# Patient Record
Sex: FEMALE | Race: OTHER | Employment: FULL TIME | URBAN - NONMETROPOLITAN AREA
[De-identification: names, ages, dates, MRNs, and addresses within clinical notes are randomized per-mention and may not be internally consistent; named-entity substitution may affect disease eponyms.]

---

## 2023-11-29 ENCOUNTER — OFFICE VISIT (OUTPATIENT)
Dept: URGENT CARE | Facility: CLINIC | Age: 48
End: 2023-11-29
Payer: COMMERCIAL

## 2023-11-29 VITALS
HEART RATE: 68 BPM | DIASTOLIC BLOOD PRESSURE: 84 MMHG | RESPIRATION RATE: 18 BRPM | OXYGEN SATURATION: 99 % | TEMPERATURE: 97.2 F | SYSTOLIC BLOOD PRESSURE: 115 MMHG

## 2023-11-29 DIAGNOSIS — H65.01 NON-RECURRENT ACUTE SEROUS OTITIS MEDIA OF RIGHT EAR: Primary | ICD-10-CM

## 2023-11-29 PROCEDURE — 99203 OFFICE O/P NEW LOW 30 MIN: CPT | Performed by: PHYSICIAN ASSISTANT

## 2023-11-29 RX ORDER — AMOXICILLIN AND CLAVULANATE POTASSIUM 875; 125 MG/1; MG/1
1 TABLET, FILM COATED ORAL EVERY 12 HOURS SCHEDULED
Qty: 14 TABLET | Refills: 0 | Status: SHIPPED | OUTPATIENT
Start: 2023-11-29 | End: 2023-12-06

## 2023-11-29 NOTE — PATIENT INSTRUCTIONS
Líquido en el oído (otitis media serosa)   CUIDADO AMBULATORIO:   La otitis media serosa (OMS) es líquido atrapado en el centro del oído, detrás del tímpano. Esta afección suele desarrollarse sin signos ni síntomas de keven infección de oído. La otitis media serosa puede ser causada por keven infección respiratoria superior o alergias. Es más común en el otoño y principios de la primavera. Signos y síntomas:  Dificultad para oír    Los sonidos se oyen amortiguados    Oído tapado o sensación de oído saturado    Molestias o chasquidos en los oídos    Silbido o zumbido en jamison oído    Llame a jamison médico si:  Usted presenta dolor intenso en el oído. La pare exterior del oído está marion o inflamada. Tiene líquido saliendo del oído. Usted tiene preguntas o inquietudes acerca de jamison condición o cuidado. Medicamentos: Es posible que usted necesite alguno de los siguientes:  Acetaminofén pratik el dolor y baja la fiebre. Está disponible sin receta médica. Pregunte la cantidad y la frecuencia con que debe tomarlos. [address] [place] las etiquetas de todos los demás medicamentos que esté usando para saber si también contienen acetaminofén, o pregunte a jamison médico o farmacéutico. El acetaminofén puede causar daño en el hígado cuando no se deja de forma correcta. SUSANNE dalia el ibuprofeno, ayudan a disminuir la inflamación, el dolor y la [place]. [place] medicamento está disponible con o sin keven receta médica. Los SUSANNE pueden causar sangrado estomacal o problemas renales en ciertas personas. Si usted deja un medicamento anticoagulante, siempre pregúntele a jamison médico si los SUSANNE son seguros para usted. Siempre chanel la etiqueta de [place] medicamento y [address] instrucciones. Los esteroides ayudan a [employer] inflamación para que el líquido pueda drenar del oído. Los antibióticos pueden ser necesarios si keven infección bacteriana causó la OMS.     Cómo mantenerse saludable:  [employer] las benita con Eura Slimmer el día. Utilice agua y [place]ure[place]. Fr[place]tese las benita enjabonadas, [employer] dedos, amena al menos 20 segundos. Enjuáguese con agua corriente caliente. Séquese las benita con keven toalla limpia o keven toalla de papel. Puede usar un desinfectante para benita que contenga alcohol, si no hay agua y jabón disponibles. Enseñe a los niños a lavarse las benita y a usar el desinfectante de [address] Evite estar con personas enfermas. Algunos microbios se propagan fácil y rápidamente con el contacto. Acuda a la consulta de control con jamison médico según las indicaciones: Anote edmund preguntas para que se acuerde de hacerlas amena edmund visitas. © Copyright [facility] 2023 Information is for End User's use only and may not be sold, redistributed or otherwise used for commercial purposes. Esta información es sólo para uso en educación. Jamison intención no es darle un consejo médico sobre enfermedades o tratamientos. Colsulte con jamison Karlaa Martha farmacéutico antes de seguir cualquier régimen médico para saber si es seguro y efectivo para usted.

## 2023-11-29 NOTE — PROGRESS NOTES
[facility]        NAME: Sven Byrd is a 53 y.o. female  : 1975    MRN: [id number]  DATE: 2023  TIME: 6:14 PM    Assessment and Plan   Non-recurrent acute serous otitis media of right ear [H65.01]  1. Non-recurrent acute serous otitis media of right ear  amoxicillin-clavulanate (AUGMENTIN) 875-125 mg per tablet            Patient Instructions     Patient Instructions   Líquido en el oído (otitis media serosa)   CUIDADO AMBULATORIO:   La otitis media serosa (OMS) es líquido atrapado en el centro del oído, detrás del tímpano. Esta afección suele desarrollarse sin signos ni síntomas de keven infección de oído. La otitis media serosa puede ser causada por keven infección respiratoria superior o alergias. Es más común en el otoño y principios de la primavera. Signos y síntomas:  Dificultad para oír    Los sonidos se oyen amortiguados    Oído tapado o sensación de oído saturado    Molestias o chasquidos en los oídos    Silbido o zumbido en jamison oído    Llame a jamison médico si:  Usted presenta dolor intenso en el oído. La pare exterior del oído está marion o inflamada. Tiene líquido saliendo del oído. Usted tiene preguntas o inquietudes acerca de jamison condición o cuidado. Medicamentos: Es posible que usted necesite alguno de los siguientes:  Acetaminofén pratik el dolor y baja la fiebre. Está disponible sin receta médica. Pregunte la cantidad y la frecuencia con que debe tomarlos.  [address] Chanel las etiquetas de todos los demás medicamentos que esté usando para saber si también contienen acetaminofén, o pregunte a jamison médico o farmacéutico. El acetaminofén puede causar daño en el hígado cuando no se deja de forma correcta. SUSANNE dalia el ibuprofeno, ayudan a disminuir la inflamación, el dolor y la [place]. [place] medicamento está disponible con o sin keven receta médica. Los SUSANNE pueden causar sangrado estomacal o problemas renales en ciertas personas.  Si usted deja un medicamento anticoagulante, siempre pregúntele a jamison médico si los SUSANNE son seguros para usted. Siempre braxton la etiqueta de [place] medicamento y [address] instrucciones. Los esteroides ayudan a [employer] inflamación para que el líquido pueda drenar del oído. Los antibióticos pueden ser necesarios si keven infección bacteriana causó la OMS. Cómo mantenerse saludable:  [employer] las benita con frecuencia amena el día. Utilice agua y [place]. Frótese las benita enjabonadas, [employer] dedos, amena al menos 20 segundos. Enjuáguese con agua corriente caliente. Séquese las benita con keven toalla limpia o keven toalla de papel. Puede usar un desinfectante para benita que contenga alcohol, si no hay agua y jabón disponibles. Enseñe a los niños a lavarse las benita y a usar el desinfectante de [address] Evite estar con personas enfermas. Algunos microbios se propagan fácil y rápidamente con el contacto. Acuda a la consulta de control con jamison médico según las indicaciones: Anote edmund preguntas para que se acuerde de hacerlas amena edmund visitas. © Copyright [employer] 2023 Information is for End User's use only and may not be sold, redistributed or otherwise used for commercial purposes. Esta información es sólo para uso en educación. Jamison intención no es darle un consejo médico sobre enfermedades o tratamientos. Colsulte con jamison Delmy Mode farmacéutico antes de seguir cualquier régimen médico para saber si es seguro y efectivo para usted. Follow up with PCP in 3-5 days. Proceed to  ER if symptoms worsen. Chief Complaint     Chief Complaint   Patient presents with    Earache     Right ear pain onset 1 month intermittent but now constant         History of Present Illness       Patient presents to the clinic for ear pain for the past month. She is also complaining of fullness and ringing. She denies associated fevers or chills.         Review of Systems   Review of Systems   Constitutional:  Negative for chills and fever. HENT:  Positive for ear pain. Negative for sore throat. Eyes:  Negative for pain and visual disturbance. Respiratory:  Negative for cough and shortness of breath. Cardiovascular:  Negative for chest pain and palpitations. Gastrointestinal:  Negative for abdominal pain and vomiting. Genitourinary:  Negative for dysuria and hematuria. Musculoskeletal:  Negative for arthralgias and back pain. Skin:  Negative for color change and rash. Neurological:  Negative for seizures and syncope. All other systems reviewed and are negative. Current Medications       Current Outpatient Medications:     amoxicillin-clavulanate (AUGMENTIN) 875-125 mg per tablet, Take 1 tablet by mouth every 12 (twelve) hours for 7 days, Disp: 14 tablet, Rfl: 0    Current Allergies     Allergies as of 11/29/2023    (No Known Allergies)            The following portions of the patient's history were reviewed and updated as appropriate: allergies, current medications, past family history, past medical history, past social history, past surgical history and problem list.     History reviewed. No pertinent past medical history. History reviewed. No pertinent surgical history. History reviewed. No pertinent family history. Medications have been verified. Objective   /84   Pulse 68   Temp (!) 97.2 °F (36.2 °C)   Resp 18   SpO2 99%        Physical Exam     Physical Exam  Constitutional:       Appearance: She is well-developed. She is not diaphoretic. HENT:      Head: Normocephalic. Right Ear: Tympanic membrane is injected and erythematous. Nose: Congestion and rhinorrhea present. Eyes:      General:         Right eye: No discharge. Left eye: No discharge. Pupils: Pupils are equal, round, and reactive to light. Neck:      Thyroid: No thyromegaly. Cardiovascular:      Rate and Rhythm: Normal rate. Heart sounds: No murmur heard.   Pulmonary: Effort: Pulmonary effort is normal. No respiratory distress. Breath sounds: No wheezing or rales. Chest:      Chest wall: No tenderness. Abdominal:      General: There is no distension. Palpations: Abdomen is soft. Tenderness: There is no abdominal tenderness. There is no guarding or rebound. Musculoskeletal:         General: Normal range of motion. Cervical back: Normal range of motion. Lymphadenopathy:      Cervical: No cervical adenopathy. Skin:     General: Skin is warm. Neurological:      Mental Status: She is alert and oriented to person, place, and time.